# Patient Record
(demographics unavailable — no encounter records)

---

## 2024-11-22 NOTE — HISTORY OF PRESENT ILLNESS
[Parents] : parents [Hours between feeds ___] : Child is fed every [unfilled] hours [Breast milk] : breast milk [Fruit] : fruit [Vegetables] : vegetables [Meat] : meat [Dairy] : dairy [Finger food] : finger food [Table food] : table food [___ stools per day] : [unfilled]  stools per day [Firm] : firm consistency [___ voids per day] : [unfilled] voids per day [Normal] : Normal [In crib] : In crib [Sippy cup use] : Sippy cup use [Brushing teeth] : Brushing teeth [Tap water] : Primary Fluoride Source: Tap water [Playtime] : Playtime  [No] : Not at  exposure [Water heater temperature set at <120 degrees F] : Water heater temperature set at <120 degrees F [Car seat in back seat] : Car seat in back seat [Smoke Detectors] : Smoke detectors [Carbon Monoxide Detectors] : Carbon monoxide detectors [Exposure to electronic nicotine delivery system] : No exposure to electronic nicotine delivery system [At risk for exposure to TB] : Not at risk for exposure to Tuberculosis [Up to date] : Up to date

## 2024-11-22 NOTE — PHYSICAL EXAM
[Alert] : alert [Normocephalic] : normocephalic [Closed Anterior [place]] : closed anterior fontanelle [Red Reflex] : red reflex bilateral [PERRL] : PERRL [Normally Placed Ears] : normally placed ears [Auricles Well Formed] : auricles well formed [Clear Tympanic membranes] : clear tympanic membranes [Light reflex present] : light reflex present [Bony landmarks visible] : bony landmarks visible [Discharge] : no discharge [Nares Patent] : nares patent [Palate Intact] : palate intact [Uvula Midline] : uvula midline [Tooth Eruption] : tooth eruption [Supple, full passive range of motion] : supple, full passive range of motion [Palpable Masses] : no palpable masses [Symmetric Chest Rise] : symmetric chest rise [Clear to Auscultation Bilaterally] : clear to auscultation bilaterally [Regular Rate and Rhythm] : regular rate and rhythm [S1, S2 present] : S1, S2 present [Murmurs] : no murmurs [+2 Femoral Pulses] : (+) 2 femoral pulses [Soft] : soft [Tender] : nontender [Distended] : nondistended [Bowel Sounds] : normoactive bowel sounds [Hepatomegaly] : no hepatomegaly [Splenomegaly] : no splenomegaly [Central Urethral Opening] : central urethral opening [Testicles Descended] : testicles descended bilaterally [No Abnormal Lymph Nodes Palpated] : no abnormal lymph nodes palpated [Symmetric Abduction and Rotation of Hips] : symmetric abduction and rotation of hips [Allis Sign] : negative Allis sign [Straight] : straight [Cranial Nerves Grossly Intact] : cranial nerves grossly intact [Rash or Lesions] : no rash/lesions

## 2024-11-22 NOTE — HISTORY OF PRESENT ILLNESS
[Parents] : parents [Hours between feeds ___] : Child is fed every [unfilled] hours [Breast milk] : breast milk [Fruit] : fruit [Vegetables] : vegetables [Meat] : meat [Dairy] : dairy [Finger food] : finger food [Table food] : table food [___ stools per day] : [unfilled]  stools per day [Firm] : firm consistency [___ voids per day] : [unfilled] voids per day [Normal] : Normal [In crib] : In crib [Sippy cup use] : Sippy cup use [Brushing teeth] : Brushing teeth [Tap water] : Primary Fluoride Source: Tap water [Playtime] : Playtime  [No] : Not at  exposure - - - [Water heater temperature set at <120 degrees F] : Water heater temperature set at <120 degrees F [Car seat in back seat] : Car seat in back seat [Smoke Detectors] : Smoke detectors [Carbon Monoxide Detectors] : Carbon monoxide detectors [Exposure to electronic nicotine delivery system] : No exposure to electronic nicotine delivery system [At risk for exposure to TB] : Not at risk for exposure to Tuberculosis [Up to date] : Up to date

## 2024-11-22 NOTE — DEVELOPMENTAL MILESTONES
[Looks for hidden objects] : looks for hidden objects [Imitates new gestures] : imitates new gestures [Follows a verbal command that] : follows a verbal command that includes a gesture [Takes first independent] : takes first independent steps [Stands without support] : stands without support [Drops object in a cup] : drops object in a cup [Picks up small object with 2 finger] : picks up small object with 2 finger pincer grasp [Picks up food and eats it] : picks up food and eats it [Normal Development] : Normal Development [None] : none [Says "Dad" or "Mom" with meaning] : says "Dad" or "Mom" with meaning [Uses one word other than Mom or] : does not use one word other than Mom or Dad or personal names

## 2024-11-22 NOTE — PHYSICAL EXAM
[Alert] : alert [Normocephalic] : normocephalic [Closed Anterior [place]] : closed anterior fontanelle [Red Reflex] : red reflex bilateral [PERRL] : PERRL [Normally Placed Ears] : normally placed ears [Auricles Well Formed] : auricles well formed [Clear Tympanic membranes] : clear tympanic membranes [Light reflex present] : light reflex present [Bony landmarks visible] : bony landmarks visible [Discharge] : no discharge [Nares Patent] : nares patent [Palate Intact] : palate intact [Uvula Midline] : uvula midline [Tooth Eruption] : tooth eruption [Supple, full passive range of motion] : supple, full passive range of motion [Palpable Masses] : no palpable masses [Symmetric Chest Rise] : symmetric chest rise [Clear to Auscultation Bilaterally] : clear to auscultation bilaterally [Regular Rate and Rhythm] : regular rate and rhythm [S1, S2 present] : S1, S2 present [Murmurs] : no murmurs [+2 Femoral Pulses] : (+) 2 femoral pulses [Soft] : soft [Tender] : nontender [Distended] : nondistended [Bowel Sounds] : normoactive bowel sounds [Hepatomegaly] : no hepatomegaly [Splenomegaly] : no splenomegaly [Central Urethral Opening] : central urethral opening [Testicles Descended] : testicles descended bilaterally [No Abnormal Lymph Nodes Palpated] : no abnormal lymph nodes palpated [Symmetric Abduction and Rotation of Hips] : symmetric abduction and rotation of hips [Allis Sign] : negative Allis sign [Straight] : straight [Cranial Nerves Grossly Intact] : cranial nerves grossly intact [Rash or Lesions] : no rash/lesions

## 2024-12-29 NOTE — HISTORY OF PRESENT ILLNESS
[FreeTextEntry6] : flu shot states that he has been having a slight runny nose and congested as well

## 2025-02-24 NOTE — HISTORY OF PRESENT ILLNESS
[Parents] : parents [Fruit] : fruit [Vegetables] : vegetables [Meat] : meat [Eggs] : eggs [Finger Foods] : finger foods [Table food] : table food [___ stools per day] : [unfilled]  stools per day [Firm] : firm consistency [Normal] : Normal [In crib] : In crib [Sippy cup use] : Sippy cup use [Tap water] : Primary Fluoride Source: Tap water [Playtime] : Playtime [Temper Tantrums] : Temper tantrums [No] : Not at  exposure [Water heater temperature set at <120 degrees F] : Water heater temperature set at <120 degrees F [Car seat in back seat] : Car seat in back seat [Carbon Monoxide Detectors] : Carbon monoxide detectors [Smoke Detectors] : Smoke detectors [Up to date] : Up to date [Exposure to electronic nicotine delivery system] : No exposure to electronic nicotine delivery system [de-identified] : Breast milk 6oz per day

## 2025-02-24 NOTE — PHYSICAL EXAM
[Alert] : alert [No Acute Distress] : no acute distress [Normocephalic] : normocephalic [Anterior [place] Closed] : anterior fontanelle closed [Red Reflex Bilateral] : red reflex bilateral [PERRL] : PERRL [Normally Placed Ears] : normally placed ears [Auricles Well Formed] : auricles well formed [Clear Tympanic membranes with present light reflex and bony landmarks] : clear tympanic membranes with present light reflex and bony landmarks [No Discharge] : no discharge [Nares Patent] : nares patent [Palate Intact] : palate intact [Uvula Midline] : uvula midline [Tooth Eruption] : tooth eruption  [Supple, full passive range of motion] : supple, full passive range of motion [No Palpable Masses] : no palpable masses [Symmetric Chest Rise] : symmetric chest rise [Clear to Auscultation Bilaterally] : clear to auscultation bilaterally [Regular Rate and Rhythm] : regular rate and rhythm [S1, S2 present] : S1, S2 present [No Murmurs] : no murmurs [+2 Femoral Pulses] : +2 femoral pulses [Soft] : soft [NonTender] : non tender [Non Distended] : non distended [Normoactive Bowel Sounds] : normoactive bowel sounds [No Hepatomegaly] : no hepatomegaly [No Splenomegaly] : no splenomegaly [Central Urethral Opening] : central urethral opening [Testicles Descended Bilaterally] : testicles descended bilaterally [Patent] : patent [Normally Placed] : normally placed [No Abnormal Lymph Nodes Palpated] : no abnormal lymph nodes palpated [No Clavicular Crepitus] : no clavicular crepitus [Negative Eaton-Ortalani] : negative Eaton-Ortalani [Symmetric Buttocks Creases] : symmetric buttocks creases [No Spinal Dimple] : no spinal dimple [NoTuft of Hair] : no tuft of hair [Cranial Nerves Grossly Intact] : cranial nerves grossly intact [No Rash or Lesions] : no rash or lesions

## 2025-02-24 NOTE — PHYSICAL EXAM
[Alert] : alert [No Acute Distress] : no acute distress [Normocephalic] : normocephalic [Anterior [place] Closed] : anterior fontanelle closed [Red Reflex Bilateral] : red reflex bilateral [PERRL] : PERRL [Normally Placed Ears] : normally placed ears [Auricles Well Formed] : auricles well formed [Clear Tympanic membranes with present light reflex and bony landmarks] : clear tympanic membranes with present light reflex and bony landmarks [No Discharge] : no discharge [Nares Patent] : nares patent [Palate Intact] : palate intact [Uvula Midline] : uvula midline [Tooth Eruption] : tooth eruption  [Supple, full passive range of motion] : supple, full passive range of motion [No Palpable Masses] : no palpable masses [Symmetric Chest Rise] : symmetric chest rise [Clear to Auscultation Bilaterally] : clear to auscultation bilaterally [Regular Rate and Rhythm] : regular rate and rhythm [S1, S2 present] : S1, S2 present [No Murmurs] : no murmurs [+2 Femoral Pulses] : +2 femoral pulses [Soft] : soft [NonTender] : non tender [Non Distended] : non distended [Normoactive Bowel Sounds] : normoactive bowel sounds [No Hepatomegaly] : no hepatomegaly [No Splenomegaly] : no splenomegaly [Central Urethral Opening] : central urethral opening [Testicles Descended Bilaterally] : testicles descended bilaterally [Patent] : patent [Normally Placed] : normally placed [No Abnormal Lymph Nodes Palpated] : no abnormal lymph nodes palpated [No Clavicular Crepitus] : no clavicular crepitus [Negative Eaton-Ortalani] : negative Eaton-Ortalani [Symmetric Buttocks Creases] : symmetric buttocks creases [No Spinal Dimple] : no spinal dimple [NoTuft of Hair] : no tuft of hair [Cranial Nerves Grossly Intact] : cranial nerves grossly intact [No Rash or Lesions] : no rash or lesions

## 2025-02-24 NOTE — HISTORY OF PRESENT ILLNESS
[Parents] : parents [Fruit] : fruit [Vegetables] : vegetables [Meat] : meat [Eggs] : eggs [Finger Foods] : finger foods [Table food] : table food [___ stools per day] : [unfilled]  stools per day [Firm] : firm consistency [Normal] : Normal [In crib] : In crib [Sippy cup use] : Sippy cup use [Tap water] : Primary Fluoride Source: Tap water [Playtime] : Playtime [Temper Tantrums] : Temper tantrums [No] : Not at  exposure [Water heater temperature set at <120 degrees F] : Water heater temperature set at <120 degrees F [Car seat in back seat] : Car seat in back seat [Carbon Monoxide Detectors] : Carbon monoxide detectors [Smoke Detectors] : Smoke detectors [Up to date] : Up to date [Exposure to electronic nicotine delivery system] : No exposure to electronic nicotine delivery system [de-identified] : Breast milk 6oz per day

## 2025-02-24 NOTE — DEVELOPMENTAL MILESTONES
[Imitates scribbling] : imitates scribbling [Points to ask for something] : points to ask for something or to get help [Speaks in sounds that seem like] : speaks in sounds that seem like an unknown language [Follows directions that do not] : follows direction that do not include a gesture [Looks when parent says,] : looks when parent says, "Where is...?" [Squats to  objects] : squats to  objects [Crawls up a few steps] : crawls up a few steps [Begins to run] : begins to run [Makes evelin with crayon] : makes evelin with cedricyon [Drops object into and takes object] : drops object into and takes object out of container [Normal Development] : Normal Development [Drinks from cup with little] : does not drink from cup with little spilling [Uses 3 words other than names] : does not use 3 words other than names [Yes] : Completed.

## 2025-06-29 NOTE — PHYSICAL EXAM
[Alert] : alert [No Acute Distress] : no acute distress [Normocephalic] : normocephalic [Anterior [place] Closed] : anterior fontanelle closed [Red Reflex Bilateral] : red reflex bilateral [PERRL] : PERRL [Normally Placed Ears] : normally placed ears [Auricles Well Formed] : auricles well formed [Clear Tympanic membranes with present light reflex and bony landmarks] : clear tympanic membranes with present light reflex and bony landmarks [No Discharge] : no discharge [Nares Patent] : nares patent [Palate Intact] : palate intact [Uvula Midline] : uvula midline [Tooth Eruption] : tooth eruption  [Supple, full passive range of motion] : supple, full passive range of motion [No Palpable Masses] : no palpable masses [Symmetric Chest Rise] : symmetric chest rise [Clear to Auscultation Bilaterally] : clear to auscultation bilaterally [Regular Rate and Rhythm] : regular rate and rhythm [S1, S2 present] : S1, S2 present [No Murmurs] : no murmurs [+2 Femoral Pulses] : +2 femoral pulses [Soft] : soft [NonTender] : non tender [Non Distended] : non distended [Normoactive Bowel Sounds] : normoactive bowel sounds [No Hepatomegaly] : no hepatomegaly [No Splenomegaly] : no splenomegaly [Central Urethral Opening] : central urethral opening [Patent] : patent [Testicles Descended Bilaterally] : testicles descended bilaterally [Normally Placed] : normally placed [No Abnormal Lymph Nodes Palpated] : no abnormal lymph nodes palpated [No Clavicular Crepitus] : no clavicular crepitus [Symmetric Buttocks Creases] : symmetric buttocks creases [No Spinal Dimple] : no spinal dimple [NoTuft of Hair] : no tuft of hair [Cranial Nerves Grossly Intact] : cranial nerves grossly intact [No Rash or Lesions] : no rash or lesions

## 2025-06-29 NOTE — HISTORY OF PRESENT ILLNESS
[Fruit] : fruit [Vegetables] : vegetables [Finger Foods] : finger foods [Table food] : table food [Normal] : Normal [Playtime] : Playtime  [No] : Not at  exposure [Water heater temperature set at <120 degrees F] : Water heater temperature set at <120 degrees F [Car seat in back seat] : Car seat in back seat [Carbon Monoxide Detectors] : Carbon monoxide detectors [Smoke Detectors] : Smoke detectors [Exposure to electronic nicotine delivery system] : No exposure to electronic nicotine delivery system [Up to date] : Up to date

## 2025-06-29 NOTE — PHYSICAL EXAM
[Alert] : alert [No Acute Distress] : no acute distress [Normocephalic] : normocephalic [Anterior [place] Closed] : anterior fontanelle closed [Red Reflex Bilateral] : red reflex bilateral [PERRL] : PERRL [Normally Placed Ears] : normally placed ears [Auricles Well Formed] : auricles well formed [Clear Tympanic membranes with present light reflex and bony landmarks] : clear tympanic membranes with present light reflex and bony landmarks [No Discharge] : no discharge [Nares Patent] : nares patent [Palate Intact] : palate intact [Uvula Midline] : uvula midline [Tooth Eruption] : tooth eruption  [Supple, full passive range of motion] : supple, full passive range of motion [No Palpable Masses] : no palpable masses [Symmetric Chest Rise] : symmetric chest rise [Clear to Auscultation Bilaterally] : clear to auscultation bilaterally [Regular Rate and Rhythm] : regular rate and rhythm [S1, S2 present] : S1, S2 present [No Murmurs] : no murmurs [+2 Femoral Pulses] : +2 femoral pulses [Soft] : soft [NonTender] : non tender [Non Distended] : non distended [Normoactive Bowel Sounds] : normoactive bowel sounds [No Hepatomegaly] : no hepatomegaly [No Splenomegaly] : no splenomegaly [Central Urethral Opening] : central urethral opening [Patent] : patent [Testicles Descended Bilaterally] : testicles descended bilaterally [Normally Placed] : normally placed [No Abnormal Lymph Nodes Palpated] : no abnormal lymph nodes palpated [No Clavicular Crepitus] : no clavicular crepitus [Symmetric Buttocks Creases] : symmetric buttocks creases [No Spinal Dimple] : no spinal dimple [NoTuft of Hair] : no tuft of hair [Cranial Nerves Grossly Intact] : cranial nerves grossly intact [No Rash or Lesions] : no rash or lesions